# Patient Record
Sex: MALE | Employment: UNEMPLOYED | URBAN - METROPOLITAN AREA
[De-identification: names, ages, dates, MRNs, and addresses within clinical notes are randomized per-mention and may not be internally consistent; named-entity substitution may affect disease eponyms.]

---
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
## 2019-05-07 ENCOUNTER — NURSE TRIAGE (OUTPATIENT)
Dept: PEDIATRICS | Facility: CLINIC | Age: 19
End: 2019-05-07

## 2019-05-07 NOTE — TELEPHONE ENCOUNTER
"Patient calls. Reports vomiting yesterday and today - had streaks of blood with emesis this morning. Feels an intermittent, stinging pain between ribs and stomach. He also had a bowel movement this morning with blood present. Denies any injuries, fever, chills or feeling faint. Patient has not tried to eat today.     Recommended appointment today for evaluation. No appointments available in our office, offered to check in another clinic or offered Urgent Care as an alternative. Patient states he will go to Urgent Care today.    Reason for Disposition    MODERATE rectal bleeding (small blood clots, passing blood without stool, or toilet water turns red)    Additional Information    Negative: Shock suspected (e.g., cold/pale/clammy skin, too weak to stand, low BP, rapid pulse)    Negative: Difficult to awaken or acting confused (e.g., disoriented, slurred speech)    Negative: Passed out (i.e., lost consciousness, collapsed and was not responding)    Negative: [1] Vomiting AND [2] contains red blood or black (\"coffee ground\") material  (Exception: few red streaks in vomit that only happened once)    Negative: Sounds like a life-threatening emergency to the triager    Negative: SEVERE rectal bleeding (large blood clots; on and off, or constant bleeding)    Negative: SEVERE dizziness (e.g., unable to stand, requires support to walk, feels like passing out now)    Negative: [1] MODERATE rectal bleeding (small blood clots, passing blood without stool, or toilet water turns red) AND [2] more than once a day    Negative: Pale skin (pallor) of new onset or worsening    Negative: Tarry or jet black-colored stool (not dark green)    Negative: [1] Constant abdominal pain AND [2] present > 2 hours    Negative: Rectal foreign body (i.e., now or within past week;  inserted or swallowed)    Negative: High-risk adult (e.g., prior surgery on aorta, abdominal aortic aneurysm)    Negative: Taking Coumadin (warfarin) or other strong " "blood thinner, or known bleeding disorder (e.g., thrombocytopenia)    Negative: Known cirrhosis of the liver (or history of liver failure or ascites)    Negative: [1] Colonoscopy AND [2] in past 72 hours    Negative: Patient sounds very sick or weak to the triager    Answer Assessment - Initial Assessment Questions  1. APPEARANCE of BLOOD: \"What color is it?\" \"Is it passed separately, on the surface of the stool, or mixed in with the stool?\"       Bright red, appeared separate from stool. Saw more on toilet paper with wiping  2. AMOUNT: \"How much blood was passed?\"       Unable to tell, but thought he wiped away a moderate amount  3. FREQUENCY: \"How many times has blood been passed with the stools?\"       once  4. ONSET: \"When was the blood first seen in the stools?\" (Days or weeks)       today  5. DIARRHEA: \"Is there also some diarrhea?\" If so, ask: \"How many diarrhea stools were passed in past 24 hours?\"       no  6. CONSTIPATION: \"Do you have constipation?\" If so, \"How bad is it?\"      no  7. RECURRENT SYMPTOMS: \"Have you had blood in your stools before?\" If so, ask: \"When was the last time?\" and \"What happened that time?\"       Happened once before  8. BLOOD THINNERS: \"Do you take any blood thinners?\" (e.g., Coumadin/warfarin, Pradaxa/dabigatran, aspirin)      no  9. OTHER SYMPTOMS: \"Do you have any other symptoms?\"  (e.g., abdominal pain, vomiting, dizziness, fever)      Vomiting yesterday and today - today there were streaks of blood in emesis. Recent URI, was taking Day-quil and Ny-quil-last doses were yesterday.    Protocols used: RECTAL BLEEDING-A-AH      "

## 2019-05-15 ENCOUNTER — NURSE TRIAGE (OUTPATIENT)
Dept: PEDIATRICS | Facility: CLINIC | Age: 19
End: 2019-05-15

## 2019-05-15 NOTE — TELEPHONE ENCOUNTER
"Patient calls, reports 2-3 month of pain behind left eye, worse when BP is high or doing activity that causes. He wears glasses, saw eye doctor about 1 year ago for vision exam. Vision Is not blurry with his glasses, but states his vision does not feel right. He also has a sharp pain in back of head for 2-3 weeks with exercising.    Recommended appointment in the next 1-2 days, offered appointment in our office or informed patient he could schedule with ophthalmology as he might be referred to them anyway. Patient prefers appointment in our office first.    Reason for Disposition    Eye pain present > 24 hours     Appointment scheduled with Dr. Solis on 5/17/19, patient declined sooner appointment.    Additional Information    Negative: Severe eye pain    Negative: Complete loss of vision in one or both eyes    Negative: [1] Eyelids are very swollen (shut or almost) AND [2] fever    Negative: [1] Eyelid (outer) is very red AND [2] fever    Negative: [1] Foreign body sensation (\"feels like something is in there\") AND [2] irrigation didn't help    Negative: Vomiting    Negative: Ulcer or sore seen on the cornea (clear center part of the eye)    Negative: [1] Recent eye surgery AND [2] increasing eye pain    Negative: [1] Blurred vision AND [2] new or worsening    Negative: Patient sounds very sick or weak to the triager    Negative: [1] Eye pain/discomfort AND [2] more than mild    Negative: [1] Eyelids are very swollen (shut or almost) AND [2] no fever    Negative: [1] Painful rash near eye AND [2] multiple small blisters grouped together    Negative: Pain followed bright light exposure from welding    Negative: Looking at light causes severe pain (i.e., photophobia)    Negative: Yellow or green pus occurs    Answer Assessment - Initial Assessment Questions  1. ONSET: \"When did the pain start?\" (e.g., minutes, hours, days)      2-3 months  2. TIMING: \"Does the pain come and go, or has it been constant since it " "started?\" (e.g., constant, intermittent, fleeting)      Intermittent pain, can stay for 15-20 minutes  3. SEVERITY: \"How bad is the pain?\"   (Scale 1-10; mild, moderate or severe)    - MILD (1-3): doesn't interfere with normal activities     - MODERATE (4-7): interferes with normal activities or awakens from sleep     - SEVERE (8-10): excruciating pain and patient unable to do normal activities      Moderate, wakes him up at night  4. LOCATION: \"Where does it hurt?\"  (e.g., eyelid, eye, cheekbone)      Left eye  5. CAUSE: \"What do you think is causing the pain?\"      unsure  6. VISION: \"Do you have blurred vision or changes in your vision?\"       Maybe double vision, but patient not sure.   7. EYE DISCHARGE: \"Is there any discharge (pus) from the eye(s)?\"  If yes, ask: \"What color is it?\"       no  8. FEVER: \"Do you have a fever?\" If so, ask: \"What is it, how was it measured, and when did it start?\"       Fever 1 week ago, lasted a week  9. OTHER SYMPTOMS: \"Do you have any other symptoms?\" (e.g., headache, nasal discharge, facial rash)      Sharp pain in back of head  10. PREGNANCY: \"Is there any chance you are pregnant?\" \"When was your last menstrual period?\"        n/a    Protocols used: EYE PAIN-A-AH      "

## 2019-05-19 ENCOUNTER — APPOINTMENT (OUTPATIENT)
Dept: CT IMAGING | Facility: CLINIC | Age: 19
End: 2019-05-19
Attending: EMERGENCY MEDICINE
Payer: COMMERCIAL

## 2019-05-19 ENCOUNTER — HOSPITAL ENCOUNTER (EMERGENCY)
Facility: CLINIC | Age: 19
Discharge: HOME | End: 2019-05-19
Attending: EMERGENCY MEDICINE | Admitting: EMERGENCY MEDICINE
Payer: COMMERCIAL

## 2019-05-19 VITALS
RESPIRATION RATE: 20 BRPM | HEART RATE: 69 BPM | DIASTOLIC BLOOD PRESSURE: 58 MMHG | TEMPERATURE: 98.3 F | OXYGEN SATURATION: 94 % | SYSTOLIC BLOOD PRESSURE: 116 MMHG

## 2019-05-19 DIAGNOSIS — K29.00 ACUTE GASTRITIS, PRESENCE OF BLEEDING UNSPECIFIED, UNSPECIFIED GASTRITIS TYPE: ICD-10-CM

## 2019-05-19 DIAGNOSIS — R11.2 NON-INTRACTABLE VOMITING WITH NAUSEA, UNSPECIFIED VOMITING TYPE: ICD-10-CM

## 2019-05-19 DIAGNOSIS — K92.1 BLOOD IN STOOL: ICD-10-CM

## 2019-05-19 LAB
ABO + RH BLD: NORMAL
ABO + RH BLD: NORMAL
ALBUMIN SERPL-MCNC: 4.1 G/DL (ref 3.4–5)
ALP SERPL-CCNC: 67 U/L (ref 40–150)
ALT SERPL W P-5'-P-CCNC: 23 U/L (ref 0–70)
ANION GAP SERPL CALCULATED.3IONS-SCNC: 3 MMOL/L (ref 3–14)
AST SERPL W P-5'-P-CCNC: 13 U/L (ref 0–45)
BASOPHILS # BLD AUTO: 0.1 10E9/L (ref 0–0.2)
BASOPHILS NFR BLD AUTO: 1.1 %
BILIRUB SERPL-MCNC: 0.3 MG/DL (ref 0.2–1.3)
BLD GP AB SCN SERPL QL: NORMAL
BLOOD BANK CMNT PATIENT-IMP: NORMAL
BUN SERPL-MCNC: 10 MG/DL (ref 7–30)
CALCIUM SERPL-MCNC: 8.5 MG/DL (ref 8.5–10.1)
CHLORIDE SERPL-SCNC: 109 MMOL/L (ref 94–109)
CO2 SERPL-SCNC: 29 MMOL/L (ref 20–32)
CREAT SERPL-MCNC: 0.71 MG/DL (ref 0.66–1.25)
DIFFERENTIAL METHOD BLD: NORMAL
EOSINOPHIL # BLD AUTO: 0.4 10E9/L (ref 0–0.7)
EOSINOPHIL NFR BLD AUTO: 5.6 %
ERYTHROCYTE [DISTWIDTH] IN BLOOD BY AUTOMATED COUNT: 12.3 % (ref 10–15)
GFR SERPL CREATININE-BSD FRML MDRD: >90 ML/MIN/{1.73_M2}
GLUCOSE SERPL-MCNC: 103 MG/DL (ref 70–99)
HCT VFR BLD AUTO: 48.2 % (ref 40–53)
HGB BLD-MCNC: 15.8 G/DL (ref 13.3–17.7)
IMM GRANULOCYTES # BLD: 0 10E9/L (ref 0–0.4)
IMM GRANULOCYTES NFR BLD: 0.4 %
LIPASE SERPL-CCNC: 464 U/L (ref 73–393)
LYMPHOCYTES # BLD AUTO: 1.3 10E9/L (ref 0.8–5.3)
LYMPHOCYTES NFR BLD AUTO: 17.1 %
MCH RBC QN AUTO: 32.6 PG (ref 26.5–33)
MCHC RBC AUTO-ENTMCNC: 32.8 G/DL (ref 31.5–36.5)
MCV RBC AUTO: 99 FL (ref 78–100)
MONOCYTES # BLD AUTO: 1.1 10E9/L (ref 0–1.3)
MONOCYTES NFR BLD AUTO: 15.6 %
NEUTROPHILS # BLD AUTO: 4.4 10E9/L (ref 1.6–8.3)
NEUTROPHILS NFR BLD AUTO: 60.2 %
NRBC # BLD AUTO: 0 10*3/UL
NRBC BLD AUTO-RTO: 0 /100
PLATELET # BLD AUTO: 215 10E9/L (ref 150–450)
POTASSIUM SERPL-SCNC: 4.2 MMOL/L (ref 3.4–5.3)
PROT SERPL-MCNC: 7.4 G/DL (ref 6.8–8.8)
RBC # BLD AUTO: 4.85 10E12/L (ref 4.4–5.9)
SODIUM SERPL-SCNC: 141 MMOL/L (ref 133–144)
SPECIMEN EXP DATE BLD: NORMAL
WBC # BLD AUTO: 7.3 10E9/L (ref 4–11)

## 2019-05-19 PROCEDURE — 85025 COMPLETE CBC W/AUTO DIFF WBC: CPT | Performed by: EMERGENCY MEDICINE

## 2019-05-19 PROCEDURE — 80053 COMPREHEN METABOLIC PANEL: CPT | Performed by: EMERGENCY MEDICINE

## 2019-05-19 PROCEDURE — 96374 THER/PROPH/DIAG INJ IV PUSH: CPT | Mod: 59

## 2019-05-19 PROCEDURE — 96361 HYDRATE IV INFUSION ADD-ON: CPT

## 2019-05-19 PROCEDURE — 74177 CT ABD & PELVIS W/CONTRAST: CPT

## 2019-05-19 PROCEDURE — C9113 INJ PANTOPRAZOLE SODIUM, VIA: HCPCS | Performed by: EMERGENCY MEDICINE

## 2019-05-19 PROCEDURE — 99285 EMERGENCY DEPT VISIT HI MDM: CPT | Mod: 25

## 2019-05-19 PROCEDURE — 96375 TX/PRO/DX INJ NEW DRUG ADDON: CPT

## 2019-05-19 PROCEDURE — 25800030 ZZH RX IP 258 OP 636: Performed by: EMERGENCY MEDICINE

## 2019-05-19 PROCEDURE — 83690 ASSAY OF LIPASE: CPT | Performed by: EMERGENCY MEDICINE

## 2019-05-19 PROCEDURE — 25000128 H RX IP 250 OP 636: Performed by: EMERGENCY MEDICINE

## 2019-05-19 PROCEDURE — 86850 RBC ANTIBODY SCREEN: CPT | Performed by: EMERGENCY MEDICINE

## 2019-05-19 PROCEDURE — 86901 BLOOD TYPING SEROLOGIC RH(D): CPT | Performed by: EMERGENCY MEDICINE

## 2019-05-19 PROCEDURE — 86900 BLOOD TYPING SEROLOGIC ABO: CPT | Performed by: EMERGENCY MEDICINE

## 2019-05-19 PROCEDURE — 25000128 H RX IP 250 OP 636

## 2019-05-19 RX ORDER — METOCLOPRAMIDE HYDROCHLORIDE 5 MG/ML
10 INJECTION INTRAMUSCULAR; INTRAVENOUS ONCE
Status: COMPLETED | OUTPATIENT
Start: 2019-05-19 | End: 2019-05-19

## 2019-05-19 RX ORDER — SUCRALFATE 1 G/1
1 TABLET ORAL 4 TIMES DAILY PRN
Qty: 60 TABLET | Refills: 0 | Status: SHIPPED | OUTPATIENT
Start: 2019-05-19 | End: 2024-05-22

## 2019-05-19 RX ORDER — IOPAMIDOL 755 MG/ML
500 INJECTION, SOLUTION INTRAVASCULAR ONCE
Status: COMPLETED | OUTPATIENT
Start: 2019-05-19 | End: 2019-05-19

## 2019-05-19 RX ORDER — DIPHENHYDRAMINE HYDROCHLORIDE 50 MG/ML
25 INJECTION INTRAMUSCULAR; INTRAVENOUS ONCE
Status: COMPLETED | OUTPATIENT
Start: 2019-05-19 | End: 2019-05-19

## 2019-05-19 RX ORDER — LIDOCAINE HYDROCHLORIDE 20 MG/ML
JELLY TOPICAL
Status: DISCONTINUED
Start: 2019-05-19 | End: 2019-05-19 | Stop reason: HOSPADM

## 2019-05-19 RX ORDER — DIPHENHYDRAMINE HYDROCHLORIDE 50 MG/ML
INJECTION INTRAMUSCULAR; INTRAVENOUS
Status: COMPLETED
Start: 2019-05-19 | End: 2019-05-19

## 2019-05-19 RX ADMIN — IOPAMIDOL 90 ML: 755 INJECTION, SOLUTION INTRAVENOUS at 11:32

## 2019-05-19 RX ADMIN — METOCLOPRAMIDE 10 MG: 5 INJECTION, SOLUTION INTRAMUSCULAR; INTRAVENOUS at 10:27

## 2019-05-19 RX ADMIN — SODIUM CHLORIDE 62 ML: 9 INJECTION, SOLUTION INTRAVENOUS at 11:32

## 2019-05-19 RX ADMIN — SODIUM CHLORIDE 1000 ML: 9 INJECTION, SOLUTION INTRAVENOUS at 10:00

## 2019-05-19 RX ADMIN — DIPHENHYDRAMINE HYDROCHLORIDE 25 MG: 50 INJECTION INTRAMUSCULAR; INTRAVENOUS at 10:56

## 2019-05-19 RX ADMIN — DIPHENHYDRAMINE HYDROCHLORIDE 25 MG: 50 INJECTION, SOLUTION INTRAMUSCULAR; INTRAVENOUS at 10:56

## 2019-05-19 RX ADMIN — SODIUM CHLORIDE 80 MG: 9 INJECTION, SOLUTION INTRAVENOUS at 10:34

## 2019-05-19 RX ADMIN — SODIUM CHLORIDE 1000 ML: 9 INJECTION, SOLUTION INTRAVENOUS at 10:34

## 2019-05-19 ASSESSMENT — ENCOUNTER SYMPTOMS
DYSURIA: 0
SORE THROAT: 1
BLOOD IN STOOL: 1
VOMITING: 1
FEVER: 0
COUGH: 0
ABDOMINAL PAIN: 1

## 2019-05-19 NOTE — ED AVS SNAPSHOT
[facility] Emergency Department  [address]  [facility] [address]  Phone:  [phone]  Fax:  [phone]                                    Daryl Jennings   MRN: [id number]    Department:  [facility] Emergency Department   Date of Visit:  5/19/2019           After Visit Summary Signature Page    I have received my discharge instructions, and my questions have been answered. I have discussed any challenges I see with this plan with the nurse or doctor.    ..........................................................................................................................................  Patient/Patient Representative Signature      ..........................................................................................................................................  Patient Representative Print Name and Relationship to Patient    ..................................................               ................................................  Date                                   Time    ..........................................................................................................................................  Reviewed by Signature/Title    ...................................................              ..............................................  Date                                               Time          22EPIC Rev 08/18

## 2019-05-19 NOTE — ED PROVIDER NOTES
History     Chief Complaint:  Hematemesis and Abdominal Pain     HPI   Daryl Jennings is an otherwise healthy 19 year old male who presents to the emergency department today for evaluation of hematemesis, abdominal pain, and rectal bleeding. Patient reports that he has been vomiting with specs of blood present everyday for the last 2-3 weeks with associated diffuse abdominal pain, more so epigastric. He recently noticed intermixed blood in his stool as well with blood when he wipes. He also complains of a sore throat. No dysuria, fevers, cough, recent travel or antibiotics. No excess alcohol or NSAID use.     Allergies:  Zofran      Medications:    Prilosec     Past Medical History:    No history of Crohn's, ulcerative colitis, or IBS.     Past Surgical History:    No history of abdominal surgeries.     Family History:    Family history reviewed. No pertinent family history.     Social History:  The patient was accompanied to the ED by mom.  Smoking Status: Current every day smoker   Smokeless Tobacco: Never Used  Alcohol Use: Negative  Drug Use: Negative    Marital Status: Single      Review of Systems   Constitutional: Negative for fever.   HENT: Positive for sore throat.    Respiratory: Negative for cough.    Gastrointestinal: Positive for abdominal pain, blood in stool and vomiting (hematemesis).   Genitourinary: Negative for dysuria.   All other systems reviewed and are negative.    Physical Exam     Patient Vitals for the past 24 hrs:   BP Temp Temp src Pulse Resp SpO2   05/19/19 1245 116/58 -- -- 69 -- 94 %   05/19/19 1145 109/60 -- -- 57 -- 98 %   05/19/19 1140 109/60 -- -- 61 -- 99 %   05/19/19 1045 124/74 -- -- 83 -- 98 %   05/19/19 1030 106/63 -- -- 74 -- 98 %   05/19/19 1015 -- -- -- -- -- 98 %   05/19/19 1000 129/81 -- -- -- -- 98 %   05/19/19 0955 129/81 98.3  F (36.8  C) Oral 76 20 100 %     Physical Exam  General: Alert, appears well-developed and well-nourished. Cooperative.     In moderate  distress  HEENT:  Head:  Atraumatic  Ears:  External ears are normal  Mouth/Throat:  Oropharynx is without erythema or exudate and mucous membranes are moist.   Eyes:   Conjunctivae normal and EOM are normal. No scleral icterus.  CV:  Normal rate, regular rhythm, normal heart sounds and radial pulses are 2+ and symmetric.  No murmur.  Resp:  Breath sounds are clear bilaterally    Non-labored, no retractions or accessory muscle use  GI:  Abdomen is soft, no distension, diffuse tenderness most at RLQ with guarding. No rebound.  No CVA tenderness bilaterally  MS:  Normal range of motion. No edema.    Normal strength in all 4 extremities.     Back atraumatic.    No midline cervical, thoracic, or lumbar tenderness  Skin:  Warm and dry.  No rash or lesions noted.  Neuro: Alert. Normal strength.  GCS: 15  Psych:  Normal mood and affect.    Emergency Department Course     Imaging:  Radiology findings were communicated with the patient who voiced understanding of the findings.    CT Abdomen Pelvis w Contrast  No abnormalities identified. No acute findings to explain the patient's pain.  Reading per radiology     Laboratory:  Laboratory findings were communicated with the patient who voiced understanding of the findings.    CBC: WBC 7.3, HGB 15.8,   CMP: Glucose 103(H) o/w WNL (Creatinine 0.71)  Lipase: 464(H)   ABO/Rh type and screen: ABO: A, Rh(D): Pos, Antibody Screen: Neg    Interventions:  1000 NS 1,000 mL IV  1027 Reglan 10 mg IV   1034 Protonix 80 mg IV   1034 NS 1,000 mg IV   1056 Benadryl 25 mg IV     Emergency Department Course:    0954 Nursing notes and vitals reviewed.    1001 IV was inserted and blood was drawn for laboratory testing, results above.    1005 I performed an exam of the patient as documented above.     1132 The patient was sent for a CT while in the emergency department, results above.     1313 Rechecked and updated.      1330 I personally reviewed the lab and imaging results with the patient  and answered all related questions prior to discharge.    Impression & Plan      Medical Decision Making:  Daryl Jennings is an otherwise healthy 19 year old male who presents for evaluation of epigastric abdominal pain with small amounts of blood in his stool and vomit. I considered a broad differential diagnosis for this patient including gastritis, GERD, cholecystitis, choledocholithiasis, biliary colic, pancreatitis, colonic or small bowel pathology, vascular etiologies including aneurysm, ulcer. Signs and symptoms here are consistent with gastritis. No peritoneal signs. Tolerates PO. Patient experienced relief here with above interventions. There are no signs of any serious etiologies as mentioned above or intraabdominal catastrophes. CT was unremarkable, hemoglobin and vitals are stable. Will refer back to primary and send the patient home on Carafate and Prilosec. Consider endoscopy/colonoscopy if further symptoms despite this. Gastritis precautions given for home.  Will return if lightheaded, dizzy, syncopal events or new concerns arise. Discharged home.     Diagnosis:    ICD-10-CM    1. Acute gastritis, presence of bleeding unspecified, unspecified gastritis type K29.00    2. Non-intractable vomiting with nausea, unspecified vomiting type R11.2    3. Blood in stool K92.1      Disposition:   The patient is discharged to home.    Discharge Medications:START taking      Dose / Directions   sucralfate 1 GM tablet  Commonly known as:  CARAFATE      Dose:  1 g  Take 1 tablet (1 g) by mouth 4 times daily as needed for nausea  Quantity:  60 tablet  Refills:  0     omeprazole 20 MG DR capsule  Commonly known as:  priLOSEC  This may have changed:  when to take this      Dose:  20 mg  Take 1 capsule (20 mg) by mouth daily  Quantity:  30 capsule  Refills:  0       Scribe Disclosure:  Mirella HORTON, am serving as a scribe at 10:06 AM on 5/19/2019 to document services personally performed by Everett Escobar MD based  on my observations and the provider's statements to me.      [facility] EMERGENCY DEPARTMENT       Everett Escobar MD  05/19/19 3602

## 2019-05-19 NOTE — ED TRIAGE NOTES
"Arrives complaining of hematemesis for \"a couple of months\". States he awakens almost daily nauseated and with epigastric pain, and then vomits which contains about a teaspoon of bright red blood. He states he also has right lower quadrant pain. He denies ETOH use, is alert and oriented, ABCs intact at this time.  "

## 2019-05-19 NOTE — LETTER
May 19, 2019      To Whom It May Concern:      Daryl Jennings was seen in our Emergency Department today, 05/19/19.  I expect his condition to improve over the next 1-2 days.  He may return to work/school when improved.    Sincerely,        Korina Royal RN

## 2019-06-21 ENCOUNTER — NURSE TRIAGE (OUTPATIENT)
Dept: NURSING | Facility: CLINIC | Age: 19
End: 2019-06-21
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
## 2019-12-23 ENCOUNTER — NURSE TRIAGE (OUTPATIENT)
Dept: INTERNAL MEDICINE | Facility: CLINIC | Age: 19
End: 2019-12-23

## 2019-12-23 NOTE — TELEPHONE ENCOUNTER
"Patient states he woke up around 11 am this morning with excruciating pain in his left knee. Patient states the pain is constant, rating it 8/10. Tried ibuprofen, little relief. Patient states he can hardly walk it hurts so bad. Denies redness, swelling, or warmth in the left knee. Denies fever.    Patient denies trauma to strenuous activity that could have caused this. Patient states he does have history of mild pain in his left knee, but nothing like this.    Advised urgent care. Care advice given per protocol. Patient verbalized understanding, agrees to go to urgent care. Agrees to call with further concerns.     Additional Information    SEVERE pain (e.g., excruciating, unable to walk)    Answer Assessment - Initial Assessment Questions  1. LOCATION and RADIATION: \"Where is the pain located?\"       Left knee  2. QUALITY: \"What does the pain feel like?\"  (e.g., sharp, dull, aching, burning)      Ache  3. SEVERITY: \"How bad is the pain?\" \"What does it keep you from doing?\"   (Scale 1-10; or mild, moderate, severe)    -  MILD (1-3): doesn't interfere with normal activities     -  MODERATE (4-7): interferes with normal activities (e.g., work or school) or awakens from sleep, limping     -  SEVERE (8-10): excruciating pain, unable to do any normal activities, unable to walk      8/10  4. ONSET: \"When did the pain start?\" \"Does it come and go, or is it there all the time?\"      This morning  5. RECURRENT: \"Have you had this pain before?\" If so, ask: \"When, and what happened then?\"      No  6. SETTING: \"Has there been any recent work, exercise or other activity that involved that part of the body?\"       No  7. AGGRAVATING FACTORS: \"What makes the knee pain worse?\" (e.g., walking, climbing stairs, running)      Walking  8. ASSOCIATED SYMPTOMS: \"Is there any swelling or redness of the knee?\"      No  9. OTHER SYMPTOMS: \"Do you have any other symptoms?\" (e.g., chest pain, difficulty breathing, fever, calf pain)      " "No  10. PREGNANCY: \"Is there any chance you are pregnant?\" \"When was your last menstrual period?\"        n/a    Protocols used: KNEE PAIN-A-OH      "

## 2020-01-14 ENCOUNTER — TELEPHONE (OUTPATIENT)
Dept: INTERNAL MEDICINE | Facility: CLINIC | Age: 20
End: 2020-01-14

## 2020-01-14 NOTE — TELEPHONE ENCOUNTER
Vomited more than 15 times today.  Is drinking a lot of water.  Urinating today, last time just a few minutes ago.  Diarrhea stools x2 today, watery.  Denies fever  Unsure if it is something he ate or not, no close contacts or household members are having same symptoms.  He was around a nephew yesterday that was later diagnosed with influenza B.    Mouth is extremely dry, he did try some tea which went down well and feels a little better.  Encouraged patient to be seen in ER for fluids, antiemetic and to have electrolytes checked, especially if vomiting and/or diarrhea continue.  Patient agrees with this.  STEFANIE Amezquita R.N.

## 2020-01-14 NOTE — TELEPHONE ENCOUNTER
Patient is calling because he is pretty sure he has food poisoning. He says he has been vomiting non stop since 3:00am and would like to speak with a nurse asap.

## 2021-02-04 ENCOUNTER — HOSPITAL ENCOUNTER (EMERGENCY)
Facility: CLINIC | Age: 21
Discharge: HOME | End: 2021-02-04
Attending: EMERGENCY MEDICINE | Admitting: EMERGENCY MEDICINE
Payer: COMMERCIAL

## 2021-02-04 VITALS
RESPIRATION RATE: 18 BRPM | BODY MASS INDEX: 25.05 KG/M2 | HEART RATE: 70 BPM | TEMPERATURE: 98.3 F | WEIGHT: 175 LBS | SYSTOLIC BLOOD PRESSURE: 136 MMHG | DIASTOLIC BLOOD PRESSURE: 91 MMHG | HEIGHT: 70 IN | OXYGEN SATURATION: 99 %

## 2021-02-04 DIAGNOSIS — K64.4 EXTERNAL HEMORRHOIDS: ICD-10-CM

## 2021-02-04 PROCEDURE — 99283 EMERGENCY DEPT VISIT LOW MDM: CPT

## 2021-02-04 RX ORDER — LIDOCAINE HYDROCHLORIDE AND HYDROCORTISONE ACETATE 30; 5 MG/G; MG/G
1 CREAM RECTAL 2 TIMES DAILY
Qty: 28.3 G | Refills: 0 | Status: SHIPPED | OUTPATIENT
Start: 2021-02-04 | End: 2024-05-22

## 2021-02-04 RX ORDER — NITROGLYCERIN 4 MG/G
1 OINTMENT RECTAL EVERY 12 HOURS
Qty: 30 G | Refills: 0 | Status: SHIPPED | OUTPATIENT
Start: 2021-02-04 | End: 2024-05-22

## 2021-02-04 ASSESSMENT — MIFFLIN-ST. JEOR: SCORE: 1800.04

## 2021-02-04 ASSESSMENT — ENCOUNTER SYMPTOMS
BLOOD IN STOOL: 1
ANAL BLEEDING: 1
FEVER: 0
ABDOMINAL PAIN: 1
SHORTNESS OF BREATH: 0
COUGH: 0

## 2021-02-04 NOTE — ED PROVIDER NOTES
"  History   Chief Complaint:  Hemorrhoids       HPI   Daryl Jennings is a 21 year old male who presents with hemorrhoids. The patient reports that for the past three weeks he has been dealing with an external hemorrhoid and that today he started to be in constant rectal pain, prompting him to the ED. The patient notes that he did have some abdominal pain yesterday as well. He states that over the past three weeks he has had pain with passing bowel movements, but states this rectal pain is constant and unbearable now. Patient also notes that he saw \"black to dark maroon\" blood in his stool today. The patient denies fever, cough, shortness of breath, and other issues.      Review of Systems   Constitutional: Negative for fever.   Respiratory: Negative for cough and shortness of breath.    Gastrointestinal: Positive for abdominal pain, anal bleeding and blood in stool.   All other systems reviewed and are negative.        Allergies:  Zofran     Medications:  The patient is not currently taking any prescribed medications.     Past Medical History:    Constipation    Past Surgical History:    The patient denies past surgical history.      Social History:  Patient presents to the ED alone.     Physical Exam     Patient Vitals for the past 24 hrs:   BP Temp Temp src Pulse Resp SpO2 Height Weight   02/04/21 0157 (!) 136/91 98.3  F (36.8  C) Oral 70 18 99 % 1.778 m (5' 10\") 79.4 kg (175 lb)       Physical Exam  Constitutional:  Oriented to person, place, and time.   HENT:   Head:    Normocephalic.   Mouth/Throat:   Oropharynx is clear and moist.   Eyes:    EOM are normal. Pupils are equal, round, and reactive to light.   Neck:    Neck supple.   Abdominal:   Soft. No distension. No tenderness. No rebound and no guarding.   :    Flesh colored external hemorrhoid noted on his right side at 3:00 o'clock with no thrombosis noted. Tender to palpation.   Musculoskeletal:  Normal range of motion.   Neurological:   Alert and " oriented to person, place, and time.           Moves all 4 extremities spontaneously    Skin:    No rash noted. No pallor.      Emergency Department Course     Emergency Department Course:    Reviewed:  I reviewed nursing notes, vitals, past medical history and care everywhere    Assessments:  0228: I initially evaluated the patient in ED room 11.    Disposition:  The patient was discharged to home.     Impression & Plan   Medical Decision Making:  Daryl Jennings is a 21 year old male who presents for evaluation of anorectal pain.  There has been associated bleeding with this.  A broad differential for their pain was considered including fissure, hemorrhoid, mass/tumor, perirectal abscess, inflammatory bowel disease, foreign body, etc.  The workup and anoscopy findings indicated that patient is having pain from acute hemorrhoids.  These were not thrombosed as they were not clotted.   There are no signs of worrisome findings to warrant further workup, or admission. Medications for discharge are noted above.  Patient given colorectal surgery consultation.     Diagnosis:    ICD-10-CM    1. External hemorrhoids  K64.4        Discharge Medications:  Discharge Medication List as of 2/4/2021  2:45 AM      START taking these medications    Details   Lidocaine-Hydrocort, Perianal, 3-0.5 % CREA Externally apply 1 inch topically 2 times daily, Disp-28.3 g, R-0, Local Print      nitroGLYcerin (RECTIV) 0.4 % OINT rectal ointment Place 1 inch (1.5 mg) rectally every 12 hours, Disp-30 g, R-0, Local Print             Scribe Disclosure:  I, Mohan Truong, am serving as a scribe at 2:14 AM on 2/4/2021 to document services personally performed by Ladarius Gonzalez MD based on my observations and the provider's statements to me.     [facility]  February 4, 2021      Ladarius Gonzalez MD  02/04/21 0581

## 2021-02-04 NOTE — ED NOTES
Pt discharge instructions reviewed, how to properly apply ointments as well as adverse reactions explained, verbalized understanding.

## 2021-02-04 NOTE — ED TRIAGE NOTES
Pt presents for evaluation of possible hemorrhoid x 3-4 weeks. Has tried preparation-H. Yesterday had abdominal pain. Today, noticed dark (maroon to black), liquidly stool. Pt states the hemorrhoid is external. Hx of constipation and straining. Painful to sit.

## 2024-05-22 ENCOUNTER — OFFICE VISIT (OUTPATIENT)
Dept: INTERNAL MEDICINE | Facility: CLINIC | Age: 24
End: 2024-05-22
Payer: COMMERCIAL

## 2024-05-22 VITALS
DIASTOLIC BLOOD PRESSURE: 86 MMHG | HEIGHT: 70 IN | OXYGEN SATURATION: 98 % | SYSTOLIC BLOOD PRESSURE: 126 MMHG | BODY MASS INDEX: 18.68 KG/M2 | HEART RATE: 93 BPM | TEMPERATURE: 98.1 F | WEIGHT: 130.5 LBS | RESPIRATION RATE: 18 BRPM

## 2024-05-22 DIAGNOSIS — R63.0 DECREASE IN APPETITE: ICD-10-CM

## 2024-05-22 DIAGNOSIS — F41.0 PANIC DISORDER WITHOUT AGORAPHOBIA: ICD-10-CM

## 2024-05-22 DIAGNOSIS — Z00.00 ROUTINE GENERAL MEDICAL EXAMINATION AT A HEALTH CARE FACILITY: Primary | ICD-10-CM

## 2024-05-22 DIAGNOSIS — Z11.3 SCREEN FOR STD (SEXUALLY TRANSMITTED DISEASE): ICD-10-CM

## 2024-05-22 LAB
ERYTHROCYTE [DISTWIDTH] IN BLOOD BY AUTOMATED COUNT: 12.1 % (ref 10–15)
HCT VFR BLD AUTO: 47.2 % (ref 40–53)
HGB BLD-MCNC: 15.6 G/DL (ref 13.3–17.7)
MCH RBC QN AUTO: 32.4 PG (ref 26.5–33)
MCHC RBC AUTO-ENTMCNC: 33.1 G/DL (ref 31.5–36.5)
MCV RBC AUTO: 98 FL (ref 78–100)
PLATELET # BLD AUTO: 250 10E3/UL (ref 150–450)
RBC # BLD AUTO: 4.82 10E6/UL (ref 4.4–5.9)
WBC # BLD AUTO: 5.9 10E3/UL (ref 4–11)

## 2024-05-22 PROCEDURE — 90651 9VHPV VACCINE 2/3 DOSE IM: CPT | Performed by: INTERNAL MEDICINE

## 2024-05-22 PROCEDURE — 36415 COLL VENOUS BLD VENIPUNCTURE: CPT | Performed by: INTERNAL MEDICINE

## 2024-05-22 PROCEDURE — 90472 IMMUNIZATION ADMIN EACH ADD: CPT | Performed by: INTERNAL MEDICINE

## 2024-05-22 PROCEDURE — 85027 COMPLETE CBC AUTOMATED: CPT | Performed by: INTERNAL MEDICINE

## 2024-05-22 PROCEDURE — 90715 TDAP VACCINE 7 YRS/> IM: CPT | Performed by: INTERNAL MEDICINE

## 2024-05-22 PROCEDURE — 80053 COMPREHEN METABOLIC PANEL: CPT | Performed by: INTERNAL MEDICINE

## 2024-05-22 PROCEDURE — 99214 OFFICE O/P EST MOD 30 MIN: CPT | Mod: 25 | Performed by: INTERNAL MEDICINE

## 2024-05-22 PROCEDURE — 90471 IMMUNIZATION ADMIN: CPT | Performed by: INTERNAL MEDICINE

## 2024-05-22 PROCEDURE — 87591 N.GONORRHOEAE DNA AMP PROB: CPT | Performed by: INTERNAL MEDICINE

## 2024-05-22 PROCEDURE — 99385 PREV VISIT NEW AGE 18-39: CPT | Mod: 25 | Performed by: INTERNAL MEDICINE

## 2024-05-22 PROCEDURE — 84443 ASSAY THYROID STIM HORMONE: CPT | Performed by: INTERNAL MEDICINE

## 2024-05-22 PROCEDURE — 87491 CHLMYD TRACH DNA AMP PROBE: CPT | Performed by: INTERNAL MEDICINE

## 2024-05-22 RX ORDER — MIRTAZAPINE 7.5 MG/1
7.5 TABLET, FILM COATED ORAL AT BEDTIME
Qty: 30 TABLET | Refills: 0 | Status: SHIPPED | OUTPATIENT
Start: 2024-05-22

## 2024-05-22 SDOH — HEALTH STABILITY: PHYSICAL HEALTH: ON AVERAGE, HOW MANY DAYS PER WEEK DO YOU ENGAGE IN MODERATE TO STRENUOUS EXERCISE (LIKE A BRISK WALK)?: 4 DAYS

## 2024-05-22 ASSESSMENT — SOCIAL DETERMINANTS OF HEALTH (SDOH): HOW OFTEN DO YOU GET TOGETHER WITH FRIENDS OR RELATIVES?: ONCE A WEEK

## 2024-05-22 NOTE — PROGRESS NOTES
Preventive Care Visit  [facility]  Florida Montejo MD, Internal Medicine  May 22, 2024      Assessment & Plan     Routine general medical examination at a health care facility  Patient received Tdap and HPV vaccinations in office today.  - CBC with platelets; Future  - Comprehensive metabolic panel; Future  - TSH with free T4 reflex; Future  - CBC with platelets  - Comprehensive metabolic panel  - TSH with free T4 reflex    Panic disorder without agoraphobia  Has never been treated for panic/anxiety concerns.  Does endorse having insomnia as a result.  Discussed with the patient on trial of mirtazapine medication.  Start medication at 7.5 mg nightly.  Medication side effect profile discussed with the patient.    - mirtazapine (REMERON) 7.5 MG tablet; Take 1 tablet (7.5 mg) by mouth at bedtime    Decrease in appetite  From symptomatology, patient endorses reduced appetite concerns  to be related to lack of hunger abdominal discomfort.would like to proceed with GI referral.  Prefers to follow-up with [facility].  Referral information provided to patient.  Mirtazapine may help with appetite stimulation.  - mirtazapine (REMERON) 7.5 MG tablet; Take 1 tablet (7.5 mg) by mouth at bedtime  - Adult GI  Referral - Consult Only; Future    Screen for STD (sexually transmitted disease)  - NEISSERIA GONORRHOEA PCR; Future  - CHLAMYDIA TRACHOMATIS PCR; Future  - NEISSERIA GONORRHOEA PCR  - CHLAMYDIA TRACHOMATIS PCR    Patient has been advised of split billing requirements and indicates understanding: Yes        Counseling  Appropriate preventive services were discussed with this patient, including applicable screening as appropriate for nutrition, physical activity          Subjective   Daryl is a 24 year old, presenting for the following:  Physical        5/22/2024    12:58 PM   Additional Questions   Roomed by Russ   Accompanied by Self        Health Care Directive  Patient does not have a Health Care  Directive or Living Will: Discussed advance care planning with patient; however, patient declined at this time.    HPI      5/22/2024   General Health   How would you rate your overall physical health? Good   Feel stress (tense, anxious, or unable to sleep) Very much   (!) STRESS CONCERN      5/22/2024   Nutrition   Three or more servings of calcium each day? (!) I DON'T KNOW   Diet: Other   If other, please elaborate: little to no dairy   How many servings of fruit and vegetables per day? (!) 2-3   How many sweetened beverages each day? 0-1         5/22/2024   Exercise   Days per week of moderate/strenous exercise 4 days         5/22/2024   Social Factors   Frequency of gathering with friends or relatives Once a week   Worry food won't last until get money to buy more Yes   Food not last or not have enough money for food? No   Do you have housing?  Yes   Are you worried about losing your housing? No   Lack of transportation? No   Unable to get utilities (heat,electricity)? No   (!) FOOD SECURITY CONCERN PRESENT      5/22/2024   Dental   Dentist two times every year? (!) NO         5/22/2024   TB Screening   Were you born outside of the [place]? No         Today's PHQ-2 Score:       5/22/2024    12:55 PM   PHQ-2 ( 1999 [employer])   Q1: Little interest or pleasure in doing things 0   Q2: Feeling down, depressed or hopeless 1   PHQ-2 Score 1   Q1: Little interest or pleasure in doing things Not at all   Q2: Feeling down, depressed or hopeless Several days   PHQ-2 Score 1           5/22/2024   Substance Use   Alcohol more than 3/day or more than 7/wk No   Do you use any other substances recreationally? (!) CANNABIS PRODUCTS     Social History     Tobacco Use    Smoking status: Never    Smokeless tobacco: Never    Tobacco comments:     Smoke CBD    Vaping Use    Vaping status: Never Used   Substance Use Topics    Alcohol use: No    Drug use: No             5/22/2024   One time HIV Screening   Previous HIV test? No          "5/22/2024   STI Screening   New sexual partner(s) since last STI/HIV test? No         5/22/2024   Contraception/Family Planning   Questions about contraception or family planning No        Reviewed and updated as needed this visit by Provider                          Review of Systems  Constitutional, HEENT, cardiovascular, pulmonary, gi and gu systems are negative, except as otherwise noted.     Objective    Exam  /86 (BP Location: Right arm, Patient Position: Sitting, Cuff Size: Adult Regular)   Pulse 93   Temp 98.1  F (36.7  C) (Tympanic)   Resp 18   Ht 1.778 m (5' 10\")   Wt 59.2 kg (130 lb 8 oz)   SpO2 98%   BMI 18.72 kg/m     Estimated body mass index is 18.72 kg/m  as calculated from the following:    Height as of this encounter: 1.778 m (5' 10\").    Weight as of this encounter: 59.2 kg (130 lb 8 oz).    Physical Exam  GENERAL: alert and no distress  EYES: Eyes grossly normal to inspection, PERRL and conjunctivae and sclerae normal  HENT: ear canals and TM's normal, nose and mouth without ulcers or lesions  RESP: lungs clear to auscultation - no rales, rhonchi or wheezes  CV: regular rate and rhythm, normal S1 S2  ABDOMEN: soft, nontender, no hepatosplenomegaly, no masses  MS: no gross musculoskeletal defects noted, no edema  NEURO: Normal strength and tone, mentation intact and speech normal  PSYCH: mentation appears normal, affect normal/bright        Signed Electronically by: Florida Montejo MD    "

## 2024-05-22 NOTE — COMMUNITY RESOURCES LIST (ENGLISH)
May 22, 2024           YOUR PERSONALIZED LIST OF SERVICES & PROGRAMS           BENEFITS NAVIGATION    Benefits Eligibility Screening      [facility] application assistance [facility]  [address] [place] [address] (Distance: 5.9 miles)  Phone: [phone]  Language: English, Haitian  Fee: Free  Accessibility: Ada accessible, Blind accommodation, Deaf or hard of hearing, Translation services      [facility] application assistance [facility]  [address] [place] [address] (Distance: 5.9 miles)  Phone: [phone]  Language: Korean, English, Haitian, Guinean  Fee: Free  Accessibility: Ada accessible, Translation services      [employer] [place] - SNAP (formerly food stamps) Screening and Application help  Phone: [phone]  Website: https://www.hungersoFolicaions.org/programs/[place]-food-helpline/  Language: English  Hours: Mon 10:00 AM - 5:00 PM Tue 10:00 AM - 5:00 PM Wed 10:00 AM - 5:00 PM Thu 10:00 AM - 5:00 PM Fri 10:00 AM - 5:00 PM  Fee: Free  Accessibility: Ada accessible, Blind accommodation, Deaf or hard of hearing, Translation services        FOOD ASSISTANCE    SNAP/WIC/Other Nutrition Benefits      [facility] application assistance [facility]  [address] [place] [address] (Distance: 5.9 miles)  Phone: [phone]  Language: Korean, English, Haitian, Guinean  Fee: Free  Accessibility: Ada accessible, Translation services      [facility] application assistance [address] [address] (Distance: 5.9 miles)  Phone: [phone]  Language: English, Haitian  Fee: Free  Accessibility: Ada accessible, Blind accommodation, Deaf or  hard of hearing, Translation services      [employer] [place] - SNAP (formerly food stamps) Screening and Application help  Phone: (995) 852-1085  Website: https://www.hungersolutions.org/programs/[place]-food-helpline/  Language: English  Hours: Mon 10:00 AM - 5:00 PM Tue 10:00 AM - 5:00 PM Wed 10:00 AM - 5:00 PM Thu 10:00 AM - 5:00 PM Fri 10:00 AM - 5:00 PM  Fee: Free  Accessibility: Ada accessible, Blind accommodation, Deaf or hard of hearing, Translation services    [facility] Food pantry [address]  [address] [place] [address] (Distance: 2.8 miles)  Language: English  Fee: Free      [facility] - Food Shelf - [facility]  [address] [place] [address] (Distance: 2.5 miles)  Phone: [phone]  Language: English  Fee: Free  Accessibility: Ada accessible      [employer] [facility] [employer]  Phone: [phone]) [phone]  Website: www.[facility].org  Language: English, Malian  Hours: Mon 9:00 AM - 3:30 PM Tue 9:00 AM - 6:30 PM Wed 9:00 AM - 3:30 PM Thu 9:00 AM - 12:30 PM Fri 9:00 AM - 12:30 PM Sat 9:00 AM - 12:00 PM  Fee: Free               IMPORTANT NUMBERS & WEBSITES        Emergency Services  911  .   [facility]  211 http://211unitedway.org  .   Poison Control  (786) 598-7684 http://[place]poison.org http://wis[place]poison.org  .     Suicide and Crisis Lifeline  988 http://988lifeline.org  .   Childhelp National Child Abuse Hotline  425.222.1186 http://Childhelphotline.org   .   National Sexual Assault Hotline  (953) 901-6753 (HOPE) http://Rainn.org   .     National Runaway Safeline  (931) 591-6971 (RUNAWAY) http://1800runaSendia.org  .   Pregnancy & Postpartum Support  Call/text 518-715-9860  [place]: http://ppsupportmn.org  [place]: http://mydoodle.com.com/wi  .   Substance Abuse National Helpline ([facility])  995-740-HSRP (8160)  http://Findtreatment.gov   .                DISCLAIMER: These resources have been generated via the [employer] Platform. [employer] does not endorse any service providers mentioned in this resource list. [employer] does not guarantee that the services mentioned in this resource list will be available to you or will improve your health or wellness.    [id number] [facility]

## 2024-05-23 LAB
ALBUMIN SERPL BCG-MCNC: 4.8 G/DL (ref 3.5–5.2)
ALP SERPL-CCNC: 47 U/L (ref 40–150)
ALT SERPL W P-5'-P-CCNC: 17 U/L (ref 0–70)
ANION GAP SERPL CALCULATED.3IONS-SCNC: 11 MMOL/L (ref 7–15)
AST SERPL W P-5'-P-CCNC: 18 U/L (ref 0–45)
BILIRUB SERPL-MCNC: 1.2 MG/DL
BUN SERPL-MCNC: 10.5 MG/DL (ref 6–20)
C TRACH DNA SPEC QL NAA+PROBE: NEGATIVE
CALCIUM SERPL-MCNC: 9.7 MG/DL (ref 8.6–10)
CHLORIDE SERPL-SCNC: 105 MMOL/L (ref 98–107)
CREAT SERPL-MCNC: 0.78 MG/DL (ref 0.67–1.17)
DEPRECATED HCO3 PLAS-SCNC: 27 MMOL/L (ref 22–29)
EGFRCR SERPLBLD CKD-EPI 2021: >90 ML/MIN/1.73M2
GLUCOSE SERPL-MCNC: 86 MG/DL (ref 70–99)
N GONORRHOEA DNA SPEC QL NAA+PROBE: NEGATIVE
POTASSIUM SERPL-SCNC: 4.3 MMOL/L (ref 3.4–5.3)
PROT SERPL-MCNC: 7.1 G/DL (ref 6.4–8.3)
SODIUM SERPL-SCNC: 143 MMOL/L (ref 135–145)
TSH SERPL DL<=0.005 MIU/L-ACNC: 0.3 UIU/ML (ref 0.3–4.2)

## 2025-01-16 ENCOUNTER — HOSPITAL ENCOUNTER (EMERGENCY)
Facility: CLINIC | Age: 25
Discharge: HOME | End: 2025-01-16
Attending: EMERGENCY MEDICINE | Admitting: EMERGENCY MEDICINE
Payer: COMMERCIAL

## 2025-01-16 ENCOUNTER — NURSE TRIAGE (OUTPATIENT)
Dept: NURSING | Facility: CLINIC | Age: 25
End: 2025-01-16
Payer: COMMERCIAL

## 2025-01-16 VITALS
BODY MASS INDEX: 20.34 KG/M2 | OXYGEN SATURATION: 97 % | SYSTOLIC BLOOD PRESSURE: 136 MMHG | WEIGHT: 141.76 LBS | HEART RATE: 60 BPM | RESPIRATION RATE: 20 BRPM | TEMPERATURE: 99.4 F | DIASTOLIC BLOOD PRESSURE: 71 MMHG

## 2025-01-16 DIAGNOSIS — R45.89 DEPRESSED MOOD: ICD-10-CM

## 2025-01-16 PROBLEM — F43.23 ADJUSTMENT DISORDER WITH MIXED ANXIETY AND DEPRESSED MOOD: Status: ACTIVE | Noted: 2025-01-16

## 2025-01-16 PROCEDURE — 250N000013 HC RX MED GY IP 250 OP 250 PS 637: Performed by: EMERGENCY MEDICINE

## 2025-01-16 PROCEDURE — 99283 EMERGENCY DEPT VISIT LOW MDM: CPT

## 2025-01-16 RX ORDER — HYDROXYZINE HYDROCHLORIDE 25 MG/1
25 TABLET, FILM COATED ORAL EVERY 8 HOURS PRN
Qty: 6 TABLET | Refills: 0 | Status: SHIPPED | OUTPATIENT
Start: 2025-01-16 | End: 2025-01-18

## 2025-01-16 RX ORDER — HYDROXYZINE HYDROCHLORIDE 25 MG/1
25 TABLET, FILM COATED ORAL ONCE
Status: COMPLETED | OUTPATIENT
Start: 2025-01-16 | End: 2025-01-16

## 2025-01-16 RX ADMIN — HYDROXYZINE HYDROCHLORIDE 25 MG: 25 TABLET, FILM COATED ORAL at 22:38

## 2025-01-16 ASSESSMENT — COLUMBIA-SUICIDE SEVERITY RATING SCALE - C-SSRS
5. HAVE YOU STARTED TO WORK OUT OR WORKED OUT THE DETAILS OF HOW TO KILL YOURSELF? DO YOU INTEND TO CARRY OUT THIS PLAN?: NO
2. HAVE YOU ACTUALLY HAD ANY THOUGHTS OF KILLING YOURSELF IN THE PAST MONTH?: YES
3. HAVE YOU BEEN THINKING ABOUT HOW YOU MIGHT KILL YOURSELF?: NO
6. HAVE YOU EVER DONE ANYTHING, STARTED TO DO ANYTHING, OR PREPARED TO DO ANYTHING TO END YOUR LIFE?: NO
1. IN THE PAST MONTH, HAVE YOU WISHED YOU WERE DEAD OR WISHED YOU COULD GO TO SLEEP AND NOT WAKE UP?: NO
4. HAVE YOU HAD THESE THOUGHTS AND HAD SOME INTENTION OF ACTING ON THEM?: YES

## 2025-01-16 ASSESSMENT — ACTIVITIES OF DAILY LIVING (ADL)
ADLS_ACUITY_SCORE: 41

## 2025-01-16 NOTE — Clinical Note
Daryl Jennings was seen and treated in our emergency department on 1/16/2025.  He may return to work on 01/18/2025.       If you have any questions or concerns, please don't hesitate to call.      Kj Craig MD

## 2025-01-17 NOTE — DISCHARGE INSTRUCTIONS
Scheduled Appointment:    Date: Saturday, 1/18/2025  Time: 6:00 pm - 7:00 pm  Provider: Migue MENDOZA  CNP,RN  Location: [place], [address] [facility] [address], [place] [address]  Phone: [phone]  Type: Telepsychiatry    __________________________________________________________________________    Scheduled Appointment:    Date: Monday, 1/20/2025  Time: 5:00 pm - 6:00 pm  Provider: Dwain Whitney MA  LMFT  Location: [facility], [address] [address], [place] [address]  Phone: [phone]  Type: Therapy - Initial (In-Person)    Scheduling instructions:    Provider sees patient in the office and via telehealth. An email address is required to schedule for telehealth services. Unwanted Referrals: Patients who are related    Patient instructions:    For patients being seen in office, please arrive 15 minutes early to check-in and complete intake paperwork. For patients being seen via telehealth, the provider will email a link to the patient to connect to the video session. Intake paperwork will be emailed prior to the appointment.    It is your responsibility to contact your insurance company directly to verify coverage, eligibility, payment, and benefit information for any appointments or referrals listed above.    [facility] maintains an extensive network of licensed behavioral health providers to connect patients with the services they need. We do not charge providers a fee to participate in our referral network. We match patients with providers based on a patient's specific treatment needs, insurance coverage, and location. Our first effort will be to refer you to a provider within your care system and will utilize providers outside your care system as needed.      __________________________________________________________________________________________________________________________________________________________________________________    Discharge Instructions  Mental Health Concerns    You were seen today for mental health concerns, such as depression, anxiety, or suicidal thinking. Your provider feels that you do not require hospitalization at this time. However, your symptoms may become worse, and you may need to return to the Emergency Department. Most treatments of depression and suicidal thoughts are a process rather than a single intervention.  Medications and counseling can take several weeks or more to help.    Generally, every Emergency Department visit should have a follow-up clinic visit with either a primary or a specialty clinic/provider. Please follow-up as instructed by your emergency provider today.    By accepting these discharge instructions:  You promise to not harm yourself or others.  You agree that if you feel you are becoming unable to keep that promise, you will do something to help yourself before you do anything to harm yourself or others.   You agree to keep any safety plan arranged on your visit here today.  You agree to take any medication prescribed or recommended by your provider.  If you are getting worse, you can contact a friend or a family member, contact your counselor or family provider, contact a crisis line, or other options discussed with the provider or therapist today.  At any time, you can call 911 and return to the Emergency Department for more help.  You understand that follow-up is essential to your treatment, and you will make and keep appointments recommended on your visit today.    How to improve your mental health and prevent suicide:  Involve others by letting family, friends, counselors know.  Do not isolate yourself.  Avoid alcohol or drugs. Remove weapons, poisons from your home.  Try to stick to routines for  eating, sleeping and getting regular exercise.    Try to get into sunlight. Bright natural light not only treats seasonal affective disorder but also depression.  Increase safe activities that you enjoy.    If you feel worse, contact 6-650-QEKLJTA ([phone]), or call 911, or your primary provider/counselor for additional assistance.    If you were given a prescription for medicine here today, be sure to read all of the information (including the package insert) that comes with your prescription.  This will include important information about the medicine, its side effects, and any warnings that you need to know about.  The pharmacist who fills the prescription can provide more information and answer questions you may have about the medicine.  If you have questions or concerns that the pharmacist cannot address, please call or return to the Emergency Department.   Remember that you can always come back to the Emergency Department if you are not able to see your regular provider in the amount of time listed above, if you get any new symptoms, or if there is anything that worries you.

## 2025-01-17 NOTE — ED PROVIDER NOTES
"  Emergency Department Note      History of Present Illness     Chief Complaint   Depression and Suicidal    HPI   Daryl Jennings is a 25 year old male who presents to the emergency department for depression and suicidal ideations. The patient states that he was feeling fine in November/December of  but reports that around the Holidays, he began experiencing an onset of depression and feelings of hopelessness.  He has on occasion noted passive suicidal ideation, though denies having a suicide plan. He states that \"nothing will work out and I can't do anything about it.\" He reports that his birthday was last week but notes that 1 year ago on his birthday, he attended his grandpas  and had flashbacks to that  on his birthday this year. He notes that he celebrated his birthday with his new girlfriend at the [employer] and went ice skating. He notes that he is also stressed about his parent's expectations on him. He reports that he noted suicidal ideation 10 years ago while in middle school.  Denies taking any daily medications. Denies any drug use or ETOH use tonight but adds that he has recently used THC. Works as a CNA    Independent Historian   None    Review of External Notes   Routine general medical exam note from 24 reviewed where he was diagnosed with panic disorder without agoraphobia    Past Medical History     Medical History and Problem List   No past pertinent medical history.    Medications   Remeron    Physical Exam     Patient Vitals for the past 24 hrs:   BP Temp Temp src Pulse Resp SpO2 Weight   25 2331 -- -- -- -- -- 97 % --   25 2330 -- -- -- -- -- 98 % --   259 136/71 -- -- 60 -- -- --   25 (!) 136/99 99.4  F (37.4  C) Oral 101 20 99 % --   25 -- -- -- -- -- -- 64.3 kg (141 lb 12.1 oz)     Physical Exam  General:   Well-nourished   Speaking in full sentences   Tearful   Not attending to internal stimuli  Eyes:   Conjunctiva " without injection or scleral icterus  ENT:   Moist mucous membranes   Nares patent   Pinnae normal  Neck:   Full ROM   No stiffness appreciated  Resp:   Lungs CTAB   No crackles, wheezing or audible rubs   Good air movement  CV:    Normal rate, regular rhythm   S1 and S2 present   No murmur, gallop or rub  GI:   BS present   Abdomen soft without distention   Non-tender to light and deep palpation   No guarding or rebound tenderness  Skin:   Warm, dry, well perfused   No rashes or open wounds on exposed skin  MSK:   Moves all extremities   No focal deformities or swelling  Neuro:   Alert   Answers questions appropriately   Moves all extremities equally   Gait stable  Psych:   Tearful   Not attending to internal stimuli   Denies suicidal plan   Not intoxicated      Diagnostics     Lab Results   Labs Ordered and Resulted from Time of ED Arrival to Time of ED Departure - No data to display    Imaging   None    Independent Interpretation   None    ED Course      Medications Administered   Medications   hydrOXYzine HCl (ATARAX) tablet 25 mg (25 mg Oral $Given 1/16/25 2238)     Discussion of Management   See below    ED Course   ED Course as of 01/16/25 2356   Thu Jan 16, 2025 2124 I obtained history and examined the patient as noted above.      2248 Spoke with DEC.  [place] stable for outpatient treatment.  Has been scheduled outpatient therapy and psychiatry.  Appointments has been scheduled for Saturday and Monday.       Additional Documentation  None    Medical Decision Making / Diagnosis     CMS Diagnoses: None    MIPS   None    MDM   Darylfany Jennings is a 25 year old male presenting to the emergency department for evaluation of depression, anxiety, and suicidal ideation.  VS on presentation reveal mildly elevated BP though otherwise are unremarkable.  History, exam, and ED course as noted above.  At present I do feel patient to be medically stable and do not appreciate any other acute organic etiology to account for his  symptomatology.  He denies any substance use, nor ingestion, and shows no signs of clinical intoxication at this time.  Patient was seen and evaluated by DEC.  Appreciate their input and please refer to their associated documentation.  At this time, they do feel patient is otherwise stable for discharge and outpatient management.  Patient has not previously establish care with a therapist or outpatient psychiatrist and thankfully appointments been made in the coming days for the patient.  On reassessment, he feels very comfortable with the outlined plan of care.  He is feeling improved.  He is felt stable for discharge at this time and  does not appear to be an imminent threat to his own safety in the outpatient environment.  He is welcome to return to the ED should he have any worsening symptoms such as thoughts of suicide, plan, or any other new or troubling symptoms.  Will provide a short course of oral hydroxyzine to be used as needed for anxiety.  All questions answered prior to discharge.    Disposition   Discharge    Diagnosis     ICD-10-CM    1. Depressed mood  R45.89            Discharge Medications   Discharge Medication List as of 1/16/2025 11:32 PM        START taking these medications    Details   hydrOXYzine HCl (ATARAX) 25 MG tablet Take 1 tablet (25 mg) by mouth every 8 hours as needed for anxiety., Disp-6 tablet, R-0, E-Prescribe           Scribe Disclosure:  I, Ángel Lopez, am serving as a scribe at 9:01 PM on 1/16/2025 to document services personally performed by Kj Craig MD based on my observations and the provider's statements to me.        Kj Craig MD  01/17/25 0003

## 2025-01-17 NOTE — PLAN OF CARE
Daryl Jennings  January 16, 2025  Plan of Care Hand-off Note     Patient Recommended Care Path: discharge    Clinical Substantiation:  After therapeutic assessment, intervention and aftercare planning by ED care team and LMHP and in consultation with attending provider, the patient's circumstances and mental state were appropriate for outpatient management. It is the recommendation of this clinician that pt discharge with OP MH support.  Patient does not currently have outpatient mental health support but was set up with therapy and medication management to begin in 2 days.  Patient has no history of self-harm or suicidal ideation, no history of any mental health care prior to today.  At this time the pt is not presenting as an acute risk to self or others due to the following factors: Pt denies SI/SIB/SA/HI and denies plans, means, or intent to harm herself or others. Patient actively participated in safety planning,    Goals for crisis stabilization:  Attending mental health appointments    Next steps for Care Team:  Discharge    Treatment Objectives Addressed:  rapport building, orienting the patient to therapy, identifying treatment goals, building self-esteem, identifying additional supports, identifying an appropriate aftercare plan, anger management, processing feelings, safety planning, identifying and practicing coping strategies, assessing safety    Therapeutic Interventions:  Engaged in safety planning    Has a specific means been identified for suicidal.homicide actions: No  If yes, describe:    Explain action steps toward mitigation:    Document completion of mitigation action:    The follow up action still needed prior to discharge:      Patient coping skills attempted to reduce the crisis:  Laid in bed all day and came to the ER    Collateral contact information:       Legal Status: Voluntary/Patient has signed consent for treatment                                                                                                                                  Reviewed court records: yes     Psychiatry Consult:     Stephani Castillo, LICSW

## 2025-01-17 NOTE — ED TRIAGE NOTES
"Pt to ER w c/o panic attack and depression. Pt crying/tearful in triage. Pt states he feels \"hopeless and nothing is going to work out\". States he has a hard time getting out of bed and eating anything. States this has been going on for about a month and he can't control it anymore. Endorses SI but states he wouldn't act on it. Hx of anxiety. VSS, ABCs intact, A&Ox4.        "

## 2025-01-17 NOTE — CONSULTS
"Diagnostic Evaluation Consultation  Crisis Assessment    Patient Name: Daryl Jennings  Age:  25 year old  Legal Sex: male  Gender Identity: male  Pronouns:   Race: Choose not to Answer  Ethnicity: Choose not to answer  Language: English      Patient was assessed: Virtual: [employer]   Crisis Assessment Start Date: 01/16/25  Crisis Assessment Start Time: 2159  Crisis Assessment Stop Time: 2247  Patient location: [facility] EMERGENCY DEPT                             [id number]    Referral Data and Chief Complaint  Daryl Jennings presents to the ED by  self. Patient is presenting to the ED for the following concerns:  . Factors that make the mental health crisis life threatening or complex are: Patient reports he is unsure of what is going on for him, he struggled to articulate some of his thoughts and feelings. He has been having anxiety attacks after naty, leading up to his birthday on the [birth year]th of [birth year]. Yesterday and today the anxiety attacks were more intense.  Patient reports that he laid in bed yesterday and today and did not get up.  Patient is struggling with low appetite due to his anxiety.  Patient reports that he has worked extremely hard since 2023 to better himself and advance his career goals.  He finally has a job he is wanted for a very long time, recently got a girlfriend and is planning on moving in with his girlfriend in a few months, and got his 's license a few weeks ago.  He states that he should be over the moon and excited about all the ongoing changes but instead feels more anxious than ever, feeling depressed and feeling unsupported by his mother and father.  Patient reports sometimes he gets the thoughts of \"I just cannot do this anymore\" \"this will never get better\" but never has concrete suicidal ideation or plans.  Patient is hopeful that interventions will be helpful.  Patient has no history of mental health support.  Patient has no history of suicidal " "ideation..      Informed Consent and Assessment Methods  Explained the crisis assessment process, including applicable information disclosures and limits to confidentiality, assessed understanding of the process, and obtained consent to proceed with the assessment.  Assessment methods included conducting a formal interview with patient, review of medical records, collaboration with medical staff, and obtaining relevant collateral information from family and community providers when available.  : done     History of the Crisis   Daryl Jennings is a 25 year old male who presents to the emergency department for depression and suicidal ideations. The patient states that he was feeling fine in 2024 but reports that around the Holidays, he began experiencing an onset of depression and suicidal ideations. Denies having a suicide plan. He states that \"nothing will work out and I can't do anything about it.\" He reports that his birthday was last week but notes that 1 year ago on his birthday, he attended his grandpas  and had flashbacks to that  on his birthday this year. He notes that he celebrated his birthday with his new girlfriend at the [employer] and went ice skating. He notes that he is also stressed about his parent's expectations on him. He reports that he attempted suicide 10+ years ago. Denies taking any daily medications. Denies any drug use or ETOH use tonight but adds that he has recently used THC. 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    Brief Psychosocial History  Family:  Single, Children no  Support System:  Parent(s), Sibling(s), Grandparent(s), Friend  Employment Status:  employed full-time  Source of Income:  salary/wages  Financial Environmental Concerns:  none  Current Hobbies:  music  Barriers in Personal Life:  mental health concerns    Significant Clinical History  Current Anxiety Symptoms:  panic attack, racing thoughts, excessive worry, " anxious  Current Depression/Trauma:  helplessness, excessive guilt, sadness, hopelessness  Current Somatic Symptoms:  anxious, excessive worry, racing thoughts  Current Psychosis/Thought Disturbance:   (Denies)  Current Eating Symptoms:  loss of appetite (Patient has a history of struggling with low appetite.)  Chemical Use History:  Alcohol: None  Benzodiazepines: None  Opiates: None  Cocaine: None  Marijuana: None  Other Use: None   Past diagnosis:  Anxiety Disorder, Depression  Family history:  No known history of mental health or chemical health concerns  Past treatment:  No known formal treatment attempts  Details of most recent treatment:     Other relevant history:  Grandpa funerals was last year on Patients birthday.    Have there been any medication changes in the past two weeks:  patient is not on psychiatric meds       Is the patient compliant with medications:   (N/A)        Collateral Information  Is there collateral information: No (Patient requested that we not reach out to his family.)      Risk Assessment  [place] Suicide Severity Rating Scale Full Clinical Version:  Suicidal Ideation  Q1 Wish to be Dead (Lifetime): Yes  Q2 Non-Specific Active Suicidal Thoughts (Lifetime): Yes  3. Active Suicidal Ideation with any Methods (Not Plan) Without Intent to Act (Lifetime): No  Q4 Active Suicidal Ideation with Some Intent to Act, Without Specific Plan (Lifetime): No  Q5 Active Suicidal Ideation with Specific Plan and Intent (Lifetime): No  Q6 Suicide Behavior (Lifetime): no  Intensity of Ideation (Lifetime)  Most Severe Ideation Rating (Lifetime): 1  Frequency (Lifetime): Less than once a week  Duration (Lifetime): Fleeting, few seconds or minutes  Controllability (Lifetime): Easily able to control thoughts  Deterrents (Lifetime): Deterrents definitely stopped you from attempting suicide  Reasons for Ideation (Lifetime): Does not apply  Suicidal Behavior (Lifetime)  Actual Attempt (Lifetime): No  Has  subject engaged in non-suicidal self-injurious behavior? (Lifetime): No  Interrupted Attempts (Lifetime): No  Aborted or Self-Interrupted Attempt (Lifetime): No  Preparatory Acts or Behavior (Lifetime): No    [place] Suicide Severity Rating Scale Recent:   Suicidal Ideation (Recent)  Q1 Wished to be Dead (Past Month): no  Q2 Suicidal Thoughts (Past Month): no  Q3 Suicidal Thought Method: no  Q4 Suicidal Intent without Specific Plan: no  Q5 Suicide Intent with Specific Plan: no  Level of Risk per Screen: no risks indicated  Intensity of Ideation (Recent)  Frequency (Past 1 Month):  (NA)  Duration (Past 1 Month):  (NA)  Controllability (Past 1 Month):  (NA)  Deterrents (Past 1 Month):  (NA)  Reasons for Ideation (Past 1 Month):  (NA)  Suicidal Behavior (Recent)  Actual Attempt (Past 3 Months): No  Has subject engaged in non-suicidal self-injurious behavior? (Past 3 Months): No  Interrupted Attempts (Past 3 Months): No  Aborted or Self-Interrupted Attempt (Past 3 Months): No  Preparatory Acts or Behavior (Past 3 Months): No    Environmental or Psychosocial Events: loss of a loved one, helplessness/hopelessness, challenging interpersonal relationships  Protective Factors: Protective Factors: lives in a responsibly safe and stable environment, help seeking, good impulse control, sense of belonging, sense of self-efficacy and/or positive self-esteem, good problem-solving, coping, and conflict resolution skills, optimistic outlook - identification of future goals    Does the patient have thoughts of harming others? Feels Like Hurting Others: no  Previous Attempt to Hurt Others: no  Is the patient engaging in sexually inappropriate behavior?: no  Does Patient have a known history of aggressive behavior: No    Is the patient engaging in sexually inappropriate behavior?  no        Mental Status Exam   Affect: Appropriate  Appearance: Appropriate  Attention Span/Concentration: Attentive  Eye Contact: Engaged    Fund of  Knowledge: Appropriate   Language /Speech Content: Fluent  Language /Speech Volume: Normal  Language /Speech Rate/Productions: Normal  Recent Memory: Intact  Remote Memory: Intact  Mood: Normal  Orientation to Person: Yes   Orientation to Place: Yes  Orientation to Time of Day: Yes  Orientation to Date: Yes     Situation (Do they understand why they are here?): Yes  Psychomotor Behavior: Normal  Thought Content: Clear  Thought Form: Intact     Mini-Cog Assessment  Number of Words Recalled:    Clock-Drawing Test:     Three Item Recall:    Mini-Cog Total Score:       Medication  Psychotropic medications:   Medication Orders - Psychiatric (From admission, onward)      None             Current Care Team  Patient Care Team:  [facility] as PCP - General (Internal Medicine)  [place] Montejo MD as Assigned PCP    Diagnosis  Patient Active Problem List   Diagnosis Code    Adjustment disorder with mixed anxiety and depressed mood F43.23       Primary Problem This Admission  Active Hospital Problems    *Adjustment disorder with mixed anxiety and depressed mood        Clinical Summary and Substantiation of Recommendations   Clinical Substantiation:  After therapeutic assessment, intervention and aftercare planning by ED care team and LMHP and in consultation with attending provider, the patient's circumstances and mental state were appropriate for outpatient management. It is the recommendation of this clinician that pt discharge with OP MH support.  Patient does not currently have outpatient mental health support but was set up with therapy and medication management to begin in 2 days.  Patient has no history of self-harm or suicidal ideation, no history of any mental health care prior to today.  At this time the pt is not presenting as an acute risk to self or others due to the following factors: Pt denies SI/SIB/SA/HI and denies plans, means, or intent to harm herself or others. Patient actively  participated in safety planning,    Goals for crisis stabilization:  Attending mental health appointments    Next steps for Care Team:  Discharge    Treatment Objectives Addressed:  rapport building, orienting the patient to therapy, identifying treatment goals, building self-esteem, identifying additional supports, identifying an appropriate aftercare plan, anger management, processing feelings, safety planning, identifying and practicing coping strategies, assessing safety    Therapeutic Interventions:  Engaged in safety planning    Has a specific means been identified for suicidal/homicide actions: No    Patient coping skills attempted to reduce the crisis:  Laid in bed all day and came to the ER    Disposition  Recommended referrals: Individual Therapy, Medication Management        Reviewed case and recommendations with attending provider. Attending Name: Kj Craig MD       Attending concurs with disposition: yes       Patient and/or validated legal guardian concurs with disposition:   yes       Final disposition:  discharge    Legal status: Voluntary/Patient has signed consent for treatment                                                Reviewed court records: yes       Assessment Details   Total duration spent with the patient: 48 min     CPT code(s) utilized: 34647 - Psychotherapy for Crisis - 60 (30-74*) min    MARTIN Wilson, Psychotherapist  [facility] - Triage & Transition Services  Callback: [phone]

## 2025-01-17 NOTE — TELEPHONE ENCOUNTER
Nurse Triage SBAR    Is this a 2nd Level Triage? NO    Situation: anxiety    Background: Patient says he is not taking his Mirtazapine.    Assessment: Patient weepy and reporting feeling very anxious.  He is denying suicidal thoughts, but is having some dyspnea and lightheadedness that continues through the phone call.    Protocol Recommended Disposition:   Go to ED Now    Recommendation: Patient verbalizes understanding of care advice and will have someone take him to ED.    Barb Goldman RN  [facility]     Reason for Disposition   [1] Difficulty breathing AND [2] persists > 10 minutes AND [3] not relieved by reassurance provided by triager    Additional Information   Negative: SEVERE difficulty breathing (e.g., struggling for each breath, speaks in single words)   Negative: Bluish (or gray) lips or face now   Negative: Difficult to awaken or acting confused (e.g., disoriented, slurred speech)   Negative: Violent behavior, or threatening to physically hurt or kill someone   Negative: Sounds like a life-threatening emergency to the triager   Negative: Chest pain   Negative: Suicide thoughts, threats, attempts, or questions    Protocols used: Anxiety and Panic Attack-A-

## 2025-05-08 ENCOUNTER — PATIENT OUTREACH (OUTPATIENT)
Dept: CARE COORDINATION | Facility: CLINIC | Age: 25
End: 2025-05-08

## 2025-07-12 ENCOUNTER — HEALTH MAINTENANCE LETTER (OUTPATIENT)
Age: 25
End: 2025-07-12